# Patient Record
Sex: MALE | Race: WHITE
[De-identification: names, ages, dates, MRNs, and addresses within clinical notes are randomized per-mention and may not be internally consistent; named-entity substitution may affect disease eponyms.]

---

## 2019-09-26 ENCOUNTER — HOSPITAL ENCOUNTER (EMERGENCY)
Dept: HOSPITAL 74 - JER | Age: 29
Discharge: HOME | End: 2019-09-26
Payer: SELF-PAY

## 2019-09-26 VITALS — DIASTOLIC BLOOD PRESSURE: 46 MMHG | SYSTOLIC BLOOD PRESSURE: 105 MMHG | TEMPERATURE: 98.1 F | HEART RATE: 59 BPM

## 2019-09-26 VITALS — BODY MASS INDEX: 26.6 KG/M2

## 2019-09-26 DIAGNOSIS — S31.000S: ICD-10-CM
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
## 2019-09-26 DIAGNOSIS — M79.5: Primary | ICD-10-CM
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
## 2021-09-29 ENCOUNTER — HOSPITAL ENCOUNTER (EMERGENCY)
Dept: HOSPITAL 74 - JERFT | Age: 31
Discharge: HOME | End: 2021-09-29
Payer: SELF-PAY

## 2021-09-29 VITALS — BODY MASS INDEX: 25.6 KG/M2

## 2021-09-29 VITALS — HEART RATE: 63 BPM | SYSTOLIC BLOOD PRESSURE: 120 MMHG | TEMPERATURE: 98 F | DIASTOLIC BLOOD PRESSURE: 72 MMHG

## 2021-09-29 DIAGNOSIS — K08.89: ICD-10-CM

## 2021-09-29 DIAGNOSIS — K05.00: Primary | ICD-10-CM

## 2021-09-29 PROCEDURE — 3E0234Z INTRODUCTION OF SERUM, TOXOID AND VACCINE INTO MUSCLE, PERCUTANEOUS APPROACH: ICD-10-PCS

## 2022-05-26 ENCOUNTER — HOSPITAL ENCOUNTER (EMERGENCY)
Dept: HOSPITAL 74 - JERFT | Age: 32
Discharge: HOME | End: 2022-05-26
Payer: COMMERCIAL

## 2022-05-26 VITALS — DIASTOLIC BLOOD PRESSURE: 63 MMHG | HEART RATE: 51 BPM | TEMPERATURE: 97.9 F | SYSTOLIC BLOOD PRESSURE: 96 MMHG

## 2022-05-26 VITALS — BODY MASS INDEX: 24.2 KG/M2

## 2022-05-26 DIAGNOSIS — R22.0: ICD-10-CM

## 2022-05-26 DIAGNOSIS — K05.00: Primary | ICD-10-CM

## 2022-05-26 PROCEDURE — 3E0233Z INTRODUCTION OF ANTI-INFLAMMATORY INTO MUSCLE, PERCUTANEOUS APPROACH: ICD-10-PCS

## 2023-09-09 ENCOUNTER — HOSPITAL ENCOUNTER (EMERGENCY)
Dept: HOSPITAL 74 - JERFT | Age: 33
Discharge: ANOTHER HOSPITAL | End: 2023-09-09
Payer: COMMERCIAL

## 2023-09-09 VITALS
HEART RATE: 58 BPM | DIASTOLIC BLOOD PRESSURE: 68 MMHG | TEMPERATURE: 98.9 F | RESPIRATION RATE: 12 BRPM | SYSTOLIC BLOOD PRESSURE: 117 MMHG

## 2023-09-09 VITALS — BODY MASS INDEX: 25.2 KG/M2

## 2023-09-09 DIAGNOSIS — K12.2: ICD-10-CM

## 2023-09-09 DIAGNOSIS — R22.0: Primary | ICD-10-CM
